# Patient Record
Sex: MALE | Race: WHITE
[De-identification: names, ages, dates, MRNs, and addresses within clinical notes are randomized per-mention and may not be internally consistent; named-entity substitution may affect disease eponyms.]

---

## 2018-06-20 ENCOUNTER — HOSPITAL ENCOUNTER (EMERGENCY)
Dept: HOSPITAL 11 - JP.ED | Age: 53
Discharge: HOME | End: 2018-06-20
Payer: COMMERCIAL

## 2018-06-20 VITALS — DIASTOLIC BLOOD PRESSURE: 94 MMHG | SYSTOLIC BLOOD PRESSURE: 144 MMHG

## 2018-06-20 DIAGNOSIS — K21.9: ICD-10-CM

## 2018-06-20 DIAGNOSIS — Z79.899: ICD-10-CM

## 2018-06-20 DIAGNOSIS — M70.42: Primary | ICD-10-CM

## 2018-06-20 PROCEDURE — 85027 COMPLETE CBC AUTOMATED: CPT

## 2018-06-20 PROCEDURE — 36415 COLL VENOUS BLD VENIPUNCTURE: CPT

## 2018-06-20 PROCEDURE — 99284 EMERGENCY DEPT VISIT MOD MDM: CPT

## 2018-06-20 PROCEDURE — 96372 THER/PROPH/DIAG INJ SC/IM: CPT

## 2018-06-20 PROCEDURE — 85379 FIBRIN DEGRADATION QUANT: CPT

## 2018-06-20 NOTE — EDM.PDOC
ED HPI GENERAL MEDICAL PROBLEM





- General


Chief Complaint: General


Stated Complaint: SWOLLEN LEFT LEG


Time Seen by Provider: 06/20/18 02:35


Source of Information: Reports: Patient


History Limitations: Reports: No Limitations





- History of Present Illness


INITIAL COMMENTS - FREE TEXT/NARRATIVE: 





53 yo male presents with about 18 hrs of progressive swelling of the left leg. 

No fever or chills. Tried icing the leg at bedtime and when that didn't help 

came in. No SOB. No calf pain. 


Onset: Gradual


Onset Date: 06/19/18


Duration: Hour(s):, Getting Worse


Location: Reports: Lower Extremity, Left


Quality: Reports: Dull


Severity: Mild


Improves with: Reports: None


Worsens with: Reports: Other (time)


Context: Reports: Other (unknown)


Associated Symptoms: Reports: No Other Symptoms


Treatments PTA: Reports: Other (see below) (Iced leg)





- Related Data


 Allergies











Allergy/AdvReac Type Severity Reaction Status Date / Time


 


No Known Allergies Allergy   Verified 06/20/18 02:22











Home Meds: 


 Home Meds





Omeprazole Magnesium [Prilosec Otc] 20 mg PO DAILY 04/09/15 [History]


Cephalexin 500 mg PO Q6H #30 capsule 06/20/18 [Rx]


Sulfamethoxazole/Trimethoprim [Bactrim Ds Tablet] 1 each PO Q12H #14 tablet 06/ 20/18 [Rx]











Past Medical History


HEENT History: Reports: Allergic Rhinitis, Impaired Vision


Gastrointestinal History: Reports: GERD


Genitourinary History: Reports: Renal Calculus


Musculoskeletal History: Reports: Fracture, Other (See Below)


Other Musculoskeletal History: chronic pain in left heal rt to break in over 

25yrs ago





- Infectious Disease History


Infectious Disease History: Reports: Chicken Pox





- Past Surgical History


HEENT Surgical History: Reports: Oral Surgery


GI Surgical History: Reports: Cholecystectomy, Colonoscopy, EGD





Social & Family History





- Tobacco Use


Smoking Status *Q: Never Smoker





- Caffeine Use


Caffeine Use: Reports: Soda





- Recreational Drug Use


Recreational Drug Use: No





ED ROS GENERAL





- Review of Systems


Review Of Systems: See Below


Constitutional: Reports: No Symptoms


HEENT: Reports: No Symptoms


Respiratory: Reports: No Symptoms


Cardiovascular: Reports: No Symptoms


GI/Abdominal: Reports: No Symptoms


Musculoskeletal: Reports: Other (L leg swollen from knee down)


Skin: Reports: No Symptoms


Neurological: Reports: No Symptoms





ED EXAM, GENERAL





- Physical Exam


Exam: See Below


Exam Limited By: No Limitations


General Appearance: Alert, WD/WN, No Apparent Distress


Respiratory/Chest: No Respiratory Distress, Lungs Clear, Normal Breath Sounds, 

No Accessory Muscle Use


Cardiovascular: Regular Rate, Rhythm


Extremities: Pedal Edema (trace edema to L leg below the knee, no calf pain. 

Neg Javi's sign.), Redness (lower half of L knee anteriorly.)


Neurological: Alert, Oriented, CN II-XII Intact, Normal Cognition


Psychiatric: Normal Affect, Normal Mood


Skin Exam: Warm, Dry, Intact, Erythema (slight erythema to anterior/lower leg 

knee with localized tenderness. ), Increased Warmth (L knee, lower half 

anteriorly.)





Course





- Vital Signs


Last Recorded V/S: 


 Last Vital Signs











Temp  36.3 C   06/20/18 02:33


 


Pulse  69   06/20/18 02:33


 


Resp  16   06/20/18 02:33


 


BP  157/99 H  06/20/18 02:33


 


Pulse Ox  98   06/20/18 02:33














- Orders/Labs/Meds


Labs: 


 Laboratory Tests











  06/20/18 06/20/18 Range/Units





  02:45 02:45 


 


WBC  7.0   (4.5-11.0)  K/uL


 


RBC  4.25 L   (4.30-5.90)  M/uL


 


Hgb  13.5   (12.0-15.0)  g/dL


 


Hct  39.2 L   (40.0-54.0)  %


 


MCV  92   (80-98)  fL


 


MCH  32 H   (27-31)  pg


 


MCHC  34   (32-36)  %


 


Plt Count  190   (150-400)  K/uL


 


D-Dimer, Quantitative   289  (0.0-400.0)  ng/mL











Meds: 


Medications














Discontinued Medications














Generic Name Dose Route Start Last Admin





  Trade Name Freq  PRN Reason Stop Dose Admin


 


Ceftriaxone Sodium  1 gm  06/20/18 03:21  





  Rocephin  IM  06/20/18 03:22  





  ONETIME ONE   





     





     





     





     


 


Trimethoprim/Sulfamethoxazole  1 tab  06/20/18 03:21  





  Septra Ds  PO  06/20/18 03:22  





  ONETIME ONE   





     





     





     





     














Departure





- Departure


Time of Disposition: 03:45


Disposition: Home, Self-Care 01


Condition: Fair


Clinical Impression: 


Prepatellar bursitis


Qualifiers:


 Laterality: left Qualified Code(s): M70.42 - Prepatellar bursitis, left knee








- Discharge Information


Prescriptions: 


Cephalexin 500 mg PO Q6H #30 capsule


Sulfamethoxazole/Trimethoprim [Bactrim Ds Tablet] 1 each PO Q12H #14 tablet


Referrals: 


PCP,None [Primary Care Provider] - 


Forms:  ED Department Discharge


Additional Instructions: 


Take Bactrim every 12 hrs. Take cephalexin every 6 hrs(start this one in 24 hrs)

. Apply moist heat to knee area several times a day. No kneeling. 





Recheck in the clinic Thursday. Return if you get a fever in the interim.

## 2020-08-31 NOTE — OR
DATE OF PROCEDURE:  08/31/2020

 

SURGEON:  Blake Pereira MD

 

PROCEDURES:

1. Esophagogastroduodenoscopy.

2. Colonoscopy.

 

FINDINGS:

1. Inflammation concerning for reflux disease at GE junction, approximately 1 cm in

    length.

2. Normal colonoscopy.

 

COMPLICATION:  None.

 

ASSISTANT:  None.

 

ANESTHETIC:  MAC.

 

RISKS:  Risks, benefits, alternatives, and limitations including, but not limited to

infection, bleeding, and perforation were explained to the patient, who wished to proceed.

 

PROCEDURE IN DETAIL:  The patient was placed in left lateral decubitus position.  The scope

was introduced and advanced atraumatically to second part of the duodenum.  No evidence of

duodenitis or ulceration.  Within the stomach itself, no evidence of gastritis.

 

The GE junction showed inflammation concerning for reflux disease and biopsied in all 4

quadrants.  The remainder of esophagus was normal.

 

Digital rectal exam was performed without abnormality.  Scope was introduced and advanced

atraumatically to the ileocecal valve.  A photo was taken of this.  Scope was brought back

through the ascending, transverse, descending colon, and retroflexed.

 

No masses.  No polyps.  No old or new blood.  No diverticulosis.  The patient tolerated the

procedure well.

 

 

 

 

Blake Pereira MD

DD:  08/31/2020 10:35:03

DT:  08/31/2020 10:59:11

Job #:  [id number]